# Patient Record
Sex: MALE | Race: WHITE | NOT HISPANIC OR LATINO | Employment: FULL TIME | URBAN - METROPOLITAN AREA
[De-identification: names, ages, dates, MRNs, and addresses within clinical notes are randomized per-mention and may not be internally consistent; named-entity substitution may affect disease eponyms.]

---

## 2017-10-17 PROBLEM — C64.9 KIDNEY CARCINOMA (HCC): Status: ACTIVE | Noted: 2017-03-20

## 2020-02-13 ENCOUNTER — OFFICE VISIT (OUTPATIENT)
Dept: CARDIOLOGY | Facility: CLINIC | Age: 50
End: 2020-02-13

## 2020-02-13 VITALS
HEART RATE: 74 BPM | BODY MASS INDEX: 33.86 KG/M2 | HEIGHT: 72 IN | DIASTOLIC BLOOD PRESSURE: 100 MMHG | WEIGHT: 250 LBS | SYSTOLIC BLOOD PRESSURE: 142 MMHG

## 2020-02-13 DIAGNOSIS — I10 ESSENTIAL HYPERTENSION: ICD-10-CM

## 2020-02-13 DIAGNOSIS — G47.30 SLEEP APNEA, UNSPECIFIED TYPE: Primary | ICD-10-CM

## 2020-02-13 DIAGNOSIS — C64.1 CARCINOMA OF RIGHT KIDNEY (HCC): ICD-10-CM

## 2020-02-13 PROCEDURE — 93000 ELECTROCARDIOGRAM COMPLETE: CPT | Performed by: INTERNAL MEDICINE

## 2020-02-13 PROCEDURE — 99204 OFFICE O/P NEW MOD 45 MIN: CPT | Performed by: INTERNAL MEDICINE

## 2020-02-13 RX ORDER — OMEPRAZOLE 20 MG/1
20 CAPSULE, DELAYED RELEASE ORAL DAILY
COMMUNITY

## 2020-02-13 RX ORDER — AMLODIPINE BESYLATE 5 MG/1
5 TABLET ORAL DAILY
COMMUNITY
Start: 2019-12-25 | End: 2020-02-13 | Stop reason: SDUPTHER

## 2020-02-13 RX ORDER — HYDROCHLOROTHIAZIDE 25 MG/1
25 TABLET ORAL DAILY
Qty: 90 TABLET | Refills: 3 | Status: SHIPPED | OUTPATIENT
Start: 2020-02-13

## 2020-02-13 RX ORDER — AMLODIPINE BESYLATE 10 MG/1
10 TABLET ORAL DAILY
Qty: 90 TABLET | Refills: 3 | Status: SHIPPED | OUTPATIENT
Start: 2020-02-13 | End: 2021-03-16

## 2020-02-13 RX ORDER — AMLODIPINE BESYLATE 10 MG/1
5 TABLET ORAL DAILY
Qty: 90 TABLET | Refills: 3 | Status: SHIPPED | OUTPATIENT
Start: 2020-02-13 | End: 2020-02-13 | Stop reason: SDUPTHER

## 2020-02-13 NOTE — PROGRESS NOTES
Subjective:     Encounter Date:02/13/2020      Patient ID: Elia Rojas is a 49 y.o. male.    Chief Complaint: Hypertension  HPI:   This is a 49-year-old man who was evaluated for hypertension.  Around [place] time he was found to have severe hypertension after presenting to the [facility] emergency room with paresthesias in the foot and a headache.  He was started on amlodipine 5 mg daily.  Since then the blood pressure has improved though not normalized.  It is frequently running in the 140s over 100 ranges.  He has no chest pain, dyspnea, syncope.  He occasionally has a rapid heart rate while measuring the blood pressure but no palpitations.  History of renal cell carcinoma status post right nephrectomy.  Otherwise he is healthy.  He does snore and is frequently falling asleep throughout the day.  He is overweight and is exercising by walking on the treadmill.  He has no exertional symptoms.   Does not smoke.  He does not drink.  There is no family history of coronary disease in first-degree relatives.  The following portions of the patient's history were reviewed and updated as appropriate: allergies, current medications, past family history, past medical history, past social history, past surgical history and problem list.     REVIEW OF SYSTEMS:   All systems reviewed.  Pertinent positives identified in HPI.  All other systems are negative.    Past Medical History:   Diagnosis Date   • Anxiety and depression    • Arthritis     hx lumbar back surgery   • Benign essential hypertension    • Elevated blood pressure reading    • Obesity    • Renal cancer (CMS/HCC)    • Right lower lobe lung mass     enlarged lymph nodes   • Sleepiness    • Snoring        Family History   Problem Relation Age of Onset   • Breast cancer Mother    • No Known Problems Father    • No Known Problems Sister    • Breast cancer Maternal Grandmother    • Breast cancer Paternal Grandmother    • Breast cancer Maternal Aunt        Social  History     Socioeconomic History   • Marital status:      Spouse name: Not on file   • Number of children: Not on file   • Years of education: Not on file   • Highest education level: Not on file   Tobacco Use   • Smoking status: Never Smoker   • Smokeless tobacco: Never Used   Substance and Sexual Activity   • Alcohol use: Yes     Comment: rarely   • Drug use: Defer   • Sexual activity: Defer       Allergies   Allergen Reactions   • Morphine Itching       Past Surgical History:   Procedure Laterality Date   • KIDNEY SURGERY     • LUNG BIOPSY           ECG 12 Lead  Date/Time: 2/13/2020 3:52 PM  Performed by: Ghazala Sage MD  Authorized by: Ghazala Sage MD   Comparison: compared with previous ECG from 12/25/2019  Similar to previous ECG  Rhythm: sinus rhythm  Rate: normal  Conduction: conduction normal  ST Segments: ST segments normal  T Waves: T waves normal  QRS axis: left  Other: no other findings    Clinical impression: non-specific ECG               Objective:         PHYSICAL EXAM:  GEN: VSS, no distress,   Eyes: normal sclera, normal lids and lashes  HENT: moist mucus membranes,   Respiratory: CTAB, no rales or wheezes  CV: RRR, no murmurs, , +2 DP and 2+ carotid pulses b/l  GI: NABS, soft,  Nontender, nondistended  MSK: no edema, no scoliosis or kyphosis  Skin: no rash, warm, dry  Heme/Lymph: no bruising or bleeding  Psych: organized thought, normal behavior and affect  Neuro: Cranial nerves grossly intact, Alert and Oriented x 3.         Assessment:          Diagnosis Plan   1. Sleep apnea, unspecified type  Ambulatory Referral to Sleep Medicine   2. Essential hypertension     3. Carcinoma of right kidney (CMS/HCC)            Plan:         1.  Hypertension: Increase Norvasc to 10 mg.  Add HCTZ 25 for diastolic hypertension.  2.  Snoring and daytime sleepiness: Refer for sleep evaluation.  3.  Obesity: Discussed weight loss strategies including exercise and dietary changes.     I will see the  patient again in the office in 6 months.       Ghazala Sage MD  02/13/20  [facility]    Outpatient Encounter Medications as of 2/13/2020   Medication Sig Dispense Refill   • amLODIPine (NORVASC) 10 MG tablet Take 1 tablet by mouth Daily. 90 tablet 3   • Cholecalciferol (VITAMIN D3) 2000 units capsule Take 4,000 Units by mouth.     • hydroCHLOROthiazide (HYDRODIURIL) 25 MG tablet Take 1 tablet by mouth Daily. 90 tablet 3   • omeprazole (priLOSEC) 20 MG capsule Take 20 mg by mouth Daily.     • [DISCONTINUED] amLODIPine (NORVASC) 10 MG tablet Take 0.5 tablets by mouth Daily. 90 tablet 3   • [DISCONTINUED] amLODIPine (NORVASC) 5 MG tablet Take 5 mg by mouth Daily.     • [DISCONTINUED] amoxicillin-clavulanate (AUGMENTIN) 875-125 MG per tablet Take 1 tablet by mouth Every 12 (Twelve) Hours. 14 tablet 0   • [DISCONTINUED] DULoxetine (CYMBALTA) 20 MG capsule Take 20 mg by mouth Daily.     • [DISCONTINUED] vitamin D (ERGOCALCIFEROL) 86987 units capsule capsule Take 50,000 Units by mouth.       No facility-administered encounter medications on file as of 2/13/2020.

## 2020-02-26 ENCOUNTER — APPOINTMENT (OUTPATIENT)
Dept: SLEEP MEDICINE | Facility: HOSPITAL | Age: 50
End: 2020-02-26

## 2020-04-17 ENCOUNTER — TELEPHONE (OUTPATIENT)
Dept: CARDIOLOGY | Facility: CLINIC | Age: 50
End: 2020-04-17

## 2020-04-17 NOTE — TELEPHONE ENCOUNTER
I called and spoke with the patient.  About 3 to 4 days ago he started having intermittent dizziness with associated blurred vision.  The blurred vision lingers a little bit longer after the dizziness resolves.  It occurs more midday than it does in the morning or nighttime.  He just feels lightheaded but has not had any near syncope.  He denies any headaches or other symptoms such as chest pain or shortness of breath.  His heart rate tends to run in the low 100s and this is what prompted him to go to the ER back in December not so much his blood pressure.  He reports he has minimally elevated heart rate was not a concern by Dr. Sage.  He does report because he is one kidney he drinks a lot of water and urinates frequently and this is kind of concerned him.  Unfortunately he only has an oncologist that he sees once yearly and is no longer following with Dr. Carrasco.  I do not see a hemoglobin A1c but his glucose was a little elevated at 138 in the ER and his GFR was 53.  For now we are going to try having him take his Norvasc in the evening time instead of in the morning at the same time he takes HCTZ and see if this helps his symptoms just by spacing out his blood pressure medication.  I also recommended that he have repeat lab work to check his renal function specifically and that he may need to have an eye exam.  We discussed possible work-up and he reported at this time he prefers to just try the medication adjustments and see if that helps and he will call back if not.

## 2020-04-17 NOTE — TELEPHONE ENCOUNTER
Pt called and left message stating he has been taking Norvasc 10 mg daily and Hctz x 2mo.. He has developed blurred vision and dizziness x 3 days  . He is wanting to know if medication should be adjusted?    Pt states bp has been around 135/87 Hr 102    Pt can be reached at [phone]

## 2021-03-16 RX ORDER — AMLODIPINE BESYLATE 10 MG/1
TABLET ORAL
Qty: 30 TABLET | Refills: 2 | Status: SHIPPED | OUTPATIENT
Start: 2021-03-16

## 2022-08-31 ENCOUNTER — HOSPITAL ENCOUNTER (EMERGENCY)
Facility: HOSPITAL | Age: 52
Discharge: HOME | End: 2022-08-31
Attending: EMERGENCY MEDICINE | Admitting: EMERGENCY MEDICINE

## 2022-08-31 VITALS
RESPIRATION RATE: 16 BRPM | SYSTOLIC BLOOD PRESSURE: 141 MMHG | HEART RATE: 80 BPM | DIASTOLIC BLOOD PRESSURE: 70 MMHG | OXYGEN SATURATION: 98 % | TEMPERATURE: 98.1 F

## 2022-08-31 DIAGNOSIS — S09.90XA CLOSED HEAD INJURY, INITIAL ENCOUNTER: ICD-10-CM

## 2022-08-31 DIAGNOSIS — S01.01XA SCALP LACERATION, INITIAL ENCOUNTER: Primary | ICD-10-CM

## 2022-08-31 PROCEDURE — 90715 TDAP VACCINE 7 YRS/> IM: CPT | Performed by: PHYSICIAN ASSISTANT

## 2022-08-31 PROCEDURE — 90471 IMMUNIZATION ADMIN: CPT | Performed by: PHYSICIAN ASSISTANT

## 2022-08-31 PROCEDURE — 25010000002 TETANUS-DIPHTH-ACELL PERTUSSIS 5-2.5-18.5 LF-MCG/0.5 SUSPENSION PREFILLED SYRINGE: Performed by: PHYSICIAN ASSISTANT

## 2022-08-31 PROCEDURE — 99282 EMERGENCY DEPT VISIT SF MDM: CPT

## 2022-08-31 RX ORDER — LIDOCAINE HYDROCHLORIDE AND EPINEPHRINE 10; 10 MG/ML; UG/ML
10 INJECTION, SOLUTION INFILTRATION; PERINEURAL ONCE
Status: COMPLETED | OUTPATIENT
Start: 2022-08-31 | End: 2022-08-31

## 2022-08-31 RX ADMIN — LIDOCAINE HYDROCHLORIDE,EPINEPHRINE BITARTRATE 10 ML: 10; .01 INJECTION, SOLUTION INFILTRATION; PERINEURAL at 19:25

## 2022-08-31 RX ADMIN — TETANUS TOXOID, REDUCED DIPHTHERIA TOXOID AND ACELLULAR PERTUSSIS VACCINE, ADSORBED 0.5 ML: 5; 2.5; 8; 8; 2.5 SUSPENSION INTRAMUSCULAR at 19:18
